# Patient Record
Sex: FEMALE | Race: WHITE | NOT HISPANIC OR LATINO | Employment: UNEMPLOYED
[De-identification: names, ages, dates, MRNs, and addresses within clinical notes are randomized per-mention and may not be internally consistent; named-entity substitution may affect disease eponyms.]

---

## 2017-05-04 ENCOUNTER — BH HISTORICAL (OUTPATIENT)
Dept: OTHER | Age: 27
End: 2017-05-04

## 2017-06-23 ENCOUNTER — BH HISTORICAL (OUTPATIENT)
Dept: OTHER | Age: 27
End: 2017-06-23

## 2017-06-26 ENCOUNTER — IMAGING SERVICES (OUTPATIENT)
Dept: OTHER | Age: 27
End: 2017-06-26

## 2017-06-26 ENCOUNTER — CHARTING TRANS (OUTPATIENT)
Dept: OTHER | Age: 27
End: 2017-06-26

## 2017-06-27 ENCOUNTER — CHARTING TRANS (OUTPATIENT)
Dept: OTHER | Age: 27
End: 2017-06-27

## 2017-06-30 ENCOUNTER — CHARTING TRANS (OUTPATIENT)
Dept: FAMILY MEDICINE | Age: 27
End: 2017-06-30

## 2017-07-01 ENCOUNTER — BH HISTORICAL (OUTPATIENT)
Dept: OTHER | Age: 27
End: 2017-07-01

## 2017-07-03 ENCOUNTER — CHARTING TRANS (OUTPATIENT)
Dept: OTHER | Age: 27
End: 2017-07-03

## 2017-07-05 ENCOUNTER — CHARTING TRANS (OUTPATIENT)
Dept: OTHER | Age: 27
End: 2017-07-05

## 2017-07-10 ENCOUNTER — LAB SERVICES (OUTPATIENT)
Dept: OTHER | Age: 27
End: 2017-07-10

## 2017-07-10 ENCOUNTER — IMAGING SERVICES (OUTPATIENT)
Dept: OTHER | Age: 27
End: 2017-07-10

## 2017-07-10 ENCOUNTER — CHARTING TRANS (OUTPATIENT)
Dept: OTHER | Age: 27
End: 2017-07-10

## 2017-07-10 LAB
AMPHET UR QL SCN: ABNORMAL
APAP UR QL SCN: ABNORMAL
BARBITURATES UR QL SCN>300 NG/ML: ABNORMAL
BENZODIAZ UR QL SCN>300 NG/ML: ABNORMAL
BZE UR QL SCN>300 NG/ML: ABNORMAL
METHADONE UR QL SCN>300 NG/ML: ABNORMAL
METHAMPHET UR QL SCN: ABNORMAL
OPIATES UR QL SCN: ABNORMAL
OXYCODONE+OXYMORPHONE UR QL SCN: NEGATIVE
PCP UR QL SCN>25 NG/ML: ABNORMAL
THC UR QL SCN>50 NG/ML: ABNORMAL
TRICYCLICS UR QL SCN: ABNORMAL

## 2017-07-12 ENCOUNTER — CHARTING TRANS (OUTPATIENT)
Dept: OTHER | Age: 27
End: 2017-07-12

## 2017-07-14 ENCOUNTER — BH HISTORICAL (OUTPATIENT)
Dept: OTHER | Age: 27
End: 2017-07-14

## 2017-07-15 LAB
MARIJUANA METAB CONF: POSITIVE
MARIJUANA METAB QT: 38 NG/ML
MARIJUANA METAB RES: POSITIVE

## 2017-07-17 ENCOUNTER — BH HISTORICAL (OUTPATIENT)
Dept: OTHER | Age: 27
End: 2017-07-17

## 2017-07-28 ENCOUNTER — CHARTING TRANS (OUTPATIENT)
Dept: OTHER | Age: 27
End: 2017-07-28

## 2017-07-31 ENCOUNTER — CHARTING TRANS (OUTPATIENT)
Dept: OBGYN | Age: 27
End: 2017-07-31

## 2017-07-31 ENCOUNTER — LAB SERVICES (OUTPATIENT)
Dept: OTHER | Age: 27
End: 2017-07-31

## 2017-08-02 ENCOUNTER — CHARTING TRANS (OUTPATIENT)
Dept: OTHER | Age: 27
End: 2017-08-02

## 2017-08-03 ENCOUNTER — CHARTING TRANS (OUTPATIENT)
Dept: OTHER | Age: 27
End: 2017-08-03

## 2017-08-07 ENCOUNTER — CHARTING TRANS (OUTPATIENT)
Dept: OTHER | Age: 27
End: 2017-08-07

## 2017-08-08 ENCOUNTER — CHARTING TRANS (OUTPATIENT)
Dept: OTHER | Age: 27
End: 2017-08-08

## 2017-08-09 ENCOUNTER — LAB SERVICES (OUTPATIENT)
Dept: OTHER | Age: 27
End: 2017-08-09

## 2017-08-09 ENCOUNTER — CHARTING TRANS (OUTPATIENT)
Dept: OTHER | Age: 27
End: 2017-08-09

## 2017-08-09 LAB — TSH SERPL DL<=0.05 MIU/L-ACNC: 0.6 M[IU]/L (ref 0.3–4.82)

## 2017-08-09 ASSESSMENT — PAIN SCALES - GENERAL: PAINLEVEL_OUTOF10: 6

## 2017-08-10 LAB
ANA SER QL IA: NORMAL RATIO (ref 0–0.6)
DSDNA IGG SERPL IA-ACNC: NORMAL [IU]/ML (ref 0–10)

## 2017-08-11 LAB
APTT INHIB SENS PPP: 25.3 SEC (ref 22–30)
APTT-LA IMM 1:2 NP PPP: 25.6 SEC (ref 22–30)
CARDIOLIPIN IGA SER IA-ACNC: <20 APL
CARDIOLIPIN IGG SER IA-ACNC: <20 GPL
CARDIOLIPIN IGM SER IA-ACNC: <20 MPL
CORTIS SERPL-MCNC: 8.8 MCG/DL (ref 3.4–22.5)
DRVVT IMM NP PPP: 37.2 SEC
LA 3 SCREEN W REFLEX-IMP: NORMAL
PATHOLOGIST NAME: NORMAL
SCREEN DRVVT: 38.6 SEC
THROMBIN TIME: 16 SEC (ref 15.3–21.1)

## 2017-08-14 LAB — AP REPORT: NORMAL

## 2017-08-22 ENCOUNTER — CHARTING TRANS (OUTPATIENT)
Dept: OTHER | Age: 27
End: 2017-08-22

## 2017-08-23 ENCOUNTER — CHARTING TRANS (OUTPATIENT)
Dept: OTHER | Age: 27
End: 2017-08-23

## 2017-09-13 ENCOUNTER — CHARTING TRANS (OUTPATIENT)
Dept: OTHER | Age: 27
End: 2017-09-13

## 2017-09-27 ENCOUNTER — CHARTING TRANS (OUTPATIENT)
Dept: OTHER | Age: 27
End: 2017-09-27

## 2017-10-06 ENCOUNTER — BH HISTORICAL (OUTPATIENT)
Dept: OTHER | Age: 27
End: 2017-10-06

## 2017-10-15 ENCOUNTER — IMAGING SERVICES (OUTPATIENT)
Dept: OTHER | Age: 27
End: 2017-10-15

## 2017-10-16 ENCOUNTER — APPOINTMENT (OUTPATIENT)
Dept: GENERAL RADIOLOGY | Age: 27
End: 2017-10-16
Attending: FAMILY MEDICINE
Payer: COMMERCIAL

## 2017-10-16 ENCOUNTER — HOSPITAL ENCOUNTER (OUTPATIENT)
Age: 27
Discharge: HOME | End: 2017-10-16
Attending: FAMILY MEDICINE
Payer: COMMERCIAL

## 2017-10-16 VITALS
SYSTOLIC BLOOD PRESSURE: 113 MMHG | RESPIRATION RATE: 16 BRPM | HEART RATE: 92 BPM | OXYGEN SATURATION: 100 % | DIASTOLIC BLOOD PRESSURE: 65 MMHG | TEMPERATURE: 98 F

## 2017-10-16 DIAGNOSIS — S63.502A LEFT WRIST SPRAIN, INITIAL ENCOUNTER: ICD-10-CM

## 2017-10-16 DIAGNOSIS — S63.92XA HAND SPRAIN, LEFT, INITIAL ENCOUNTER: Primary | ICD-10-CM

## 2017-10-16 DIAGNOSIS — S60.222A CONTUSION OF LEFT HAND, INITIAL ENCOUNTER: ICD-10-CM

## 2017-10-16 PROCEDURE — 99213 OFFICE O/P EST LOW 20 MIN: CPT

## 2017-10-16 PROCEDURE — 73110 X-RAY EXAM OF WRIST: CPT | Performed by: FAMILY MEDICINE

## 2017-10-16 PROCEDURE — 73130 X-RAY EXAM OF HAND: CPT | Performed by: FAMILY MEDICINE

## 2017-10-16 RX ORDER — DILTIAZEM HYDROCHLORIDE 60 MG/1
60 TABLET, FILM COATED ORAL 4 TIMES DAILY
COMMUNITY

## 2017-10-16 RX ORDER — ONDANSETRON 4 MG/1
4 TABLET, ORALLY DISINTEGRATING ORAL EVERY 8 HOURS PRN
COMMUNITY

## 2017-10-16 RX ORDER — CLONAZEPAM 0.5 MG/1
0.5 TABLET ORAL 2 TIMES DAILY PRN
COMMUNITY

## 2017-10-16 RX ORDER — QUETIAPINE 200 MG/1
200 TABLET, FILM COATED ORAL NIGHTLY
COMMUNITY

## 2017-10-16 RX ORDER — IBUPROFEN 200 MG
400 TABLET ORAL EVERY 6 HOURS PRN
COMMUNITY

## 2017-10-16 RX ORDER — PANTOPRAZOLE SODIUM 40 MG/1
40 TABLET, DELAYED RELEASE ORAL
COMMUNITY

## 2017-10-16 NOTE — ED INITIAL ASSESSMENT (HPI)
Patient fell down a flight of stairs yesterday at approximately 0100. C/O left hand pain that radiates into her right wrist. States she was treated at [facility] ED at that time. Was told she had a \"hairline fracture and there is nothing they could do.  It wou

## 2017-10-16 NOTE — ED PROVIDER NOTES
Patient Seen in: [id number] [facility]    History   Patient presents with:  Hand Injury    Stated Complaint: left hand and wrist pain/fell at home    HPI    31-year-old female who presents to the clinic today with chief complaints of injury to HPI.    Physical Exam   ED Triage Vitals [10/16/17 1245]  BP: 113/65  Pulse: 89  Resp: 16  Temp: 98 °F (36.7 °C)  Temp src: Temporal  SpO2: 98 %  O2 Device: None (Room air)    Current:/65   Pulse 89   Temp 98 °F (36.7 °C) (Temporal)   Resp 16   LMP 1 nothing they could do. It would heal itself\". States she has 15  previous fractures to the left hand. CONCLUSION:  The joint spaces are normal. There is no fracture.  There is dorsal soft tissue swelling at the level of metacarpals, moderate degre (primary encounter diagnosis)  Left wrist sprain, initial encounter  Contusion of left hand, initial encounter    Disposition:  Discharge    Follow-up:  Zulma Nam [phone]    In 1 week  For re-check      Reina Khalil

## 2017-10-18 ENCOUNTER — BH HISTORICAL (OUTPATIENT)
Dept: OTHER | Age: 27
End: 2017-10-18

## 2017-10-19 ENCOUNTER — CHARTING TRANS (OUTPATIENT)
Dept: SPORTS MEDICINE | Age: 27
End: 2017-10-19

## 2017-10-19 ENCOUNTER — IMAGING SERVICES (OUTPATIENT)
Dept: OTHER | Age: 27
End: 2017-10-19

## 2017-11-02 ENCOUNTER — IMAGING SERVICES (OUTPATIENT)
Dept: OTHER | Age: 27
End: 2017-11-02

## 2017-11-06 ENCOUNTER — CHARTING TRANS (OUTPATIENT)
Dept: OTHER | Age: 27
End: 2017-11-06

## 2017-11-20 ENCOUNTER — CHARTING TRANS (OUTPATIENT)
Dept: OTHER | Age: 27
End: 2017-11-20

## 2017-12-01 ENCOUNTER — BH HISTORICAL (OUTPATIENT)
Dept: OTHER | Age: 27
End: 2017-12-01

## 2017-12-15 ENCOUNTER — CHARTING TRANS (OUTPATIENT)
Dept: OTHER | Age: 27
End: 2017-12-15

## 2017-12-18 ENCOUNTER — CHARTING TRANS (OUTPATIENT)
Dept: OBGYN | Age: 27
End: 2017-12-18

## 2018-01-02 ENCOUNTER — CHARTING TRANS (OUTPATIENT)
Dept: OTHER | Age: 28
End: 2018-01-02

## 2018-01-03 ENCOUNTER — LAB SERVICES (OUTPATIENT)
Dept: OTHER | Age: 28
End: 2018-01-03

## 2018-01-03 LAB
FOLLICLE STIMULATING: 5.11 M[IU]/ML (ref 2–25)
GLUCOSE P FAST SERPL-MCNC: 97 MG/DL (ref 60–100)
PROLACTIN SERPL-MCNC: 11.1 NG/ML (ref 3–19)
TSH SERPL DL<=0.05 MIU/L-ACNC: 1.51 M[IU]/L (ref 0.3–4.82)

## 2018-01-04 ENCOUNTER — CHARTING TRANS (OUTPATIENT)
Dept: OTHER | Age: 28
End: 2018-01-04

## 2018-02-21 ENCOUNTER — LAB SERVICES (OUTPATIENT)
Dept: OTHER | Age: 28
End: 2018-02-21

## 2018-02-21 LAB
APPEARANCE: ABNORMAL
BASOPHIL AUTOMATED: 0.5 %
BASOPHILS: 0 (ref 0–0.2)
BILIRUBIN: ABNORMAL
COLOR: YELLOW
EOSINOPHIL AUTOMATED: 7.7 %
EOSINOPHILS: 0.6 (ref 0–0.5)
GLUCOSE, URINE, AUTOMATED: ABNORMAL MG/DL
HEMATOCRIT: 40.4 % (ref 34.7–45.1)
HEMOGLOBIN: 13.2 GM/DL (ref 12–15.3)
INFLUENZA ANTIGEN: NORMAL
KETONES, URINE, AUTOMATED: ABNORMAL MG/DL
LEUKOCYTE, URINE, AUTOMATED: ABNORMAL
LYMPHOCYTE AUTOMATED: 20.9 %
LYMPHOCYTES: 1.5 (ref 0.6–3.4)
MEAN CORPUSCULAR HGB: 28.9 PG (ref 26–34)
MEAN CORPUSCULAR HGB: 32.6 G/DL (ref 32.5–35.8)
MEAN CORPUSCULAR VOL: 88.7 FL (ref 80–100)
MEAN PLATELET VOLUME: 10.7 FL (ref 6.8–10.2)
MONOCYTE AUTOMATED: 10 %
MONOCYTES: 0.7 (ref 0.3–1)
NEUTROPHIL ABSOLUTE: 4.4 (ref 1.7–7.7)
NEUTROPHIL AUTOMATED: 60.9 %
NITRITE, URINE AUTOMATED: NEGATIVE
PH, URINE: 5 (ref 5–8)
PLATELET COUNT: 198 K/MM3 (ref 150–450)
PROTEIN, URINE: ABNORMAL MG/DL
RED BLOOD CELL COUNT: 4.56 M/MM3 (ref 3.63–5.04)
RED CELL DISTRIBUTIO: 14.6 % (ref 11.9–15.9)
SPECIFIC GRAVITY UA: 1.02 (ref 1–1.03)
URINE, BLOOD, AUTOMATED: ABNORMAL
UROBILINOGEN, URINE, AUTOMATED: ABNORMAL MG/DL
WHITE BLOOD CELL COU: 7.2 K/MM3 (ref 4–11)

## 2018-02-26 ENCOUNTER — CHARTING TRANS (OUTPATIENT)
Dept: OTHER | Age: 28
End: 2018-02-26

## 2018-03-09 ENCOUNTER — ANCILLARY ORDERS (OUTPATIENT)
Dept: OTHER | Age: 28
End: 2018-03-09

## 2018-03-09 ENCOUNTER — CHARTING TRANS (OUTPATIENT)
Dept: OTHER | Age: 28
End: 2018-03-09

## 2018-03-09 DIAGNOSIS — Z80.3 FAMILY HISTORY OF MALIGNANT NEOPLASM OF BREAST: ICD-10-CM

## 2018-03-09 DIAGNOSIS — N64.4 MASTODYNIA: ICD-10-CM

## 2018-04-12 ENCOUNTER — CHARTING TRANS (OUTPATIENT)
Dept: OTHER | Age: 28
End: 2018-04-12

## 2018-04-16 ENCOUNTER — CHARTING TRANS (OUTPATIENT)
Dept: OTHER | Age: 28
End: 2018-04-16

## 2018-04-16 ENCOUNTER — ANCILLARY ORDERS (OUTPATIENT)
Dept: OTHER | Age: 28
End: 2018-04-16

## 2018-04-16 DIAGNOSIS — N64.4 MASTODYNIA: ICD-10-CM

## 2018-04-16 DIAGNOSIS — N60.19 DIFFUSE CYSTIC MASTOPATHY OF BREAST: ICD-10-CM

## 2018-05-23 ENCOUNTER — CHARTING TRANS (OUTPATIENT)
Dept: OTHER | Age: 28
End: 2018-05-23

## 2018-11-23 ENCOUNTER — IMAGING SERVICES (OUTPATIENT)
Dept: OTHER | Age: 28
End: 2018-11-23

## 2018-11-27 VITALS
WEIGHT: 160 LBS | SYSTOLIC BLOOD PRESSURE: 102 MMHG | BODY MASS INDEX: 32.25 KG/M2 | DIASTOLIC BLOOD PRESSURE: 64 MMHG | HEIGHT: 59 IN

## 2018-11-28 VITALS
BODY MASS INDEX: 28.63 KG/M2 | OXYGEN SATURATION: 98 % | RESPIRATION RATE: 16 BRPM | HEIGHT: 59 IN | SYSTOLIC BLOOD PRESSURE: 94 MMHG | HEART RATE: 82 BPM | DIASTOLIC BLOOD PRESSURE: 58 MMHG | WEIGHT: 142 LBS

## 2018-11-28 VITALS
TEMPERATURE: 98.4 F | WEIGHT: 146 LBS | DIASTOLIC BLOOD PRESSURE: 80 MMHG | SYSTOLIC BLOOD PRESSURE: 118 MMHG | HEART RATE: 80 BPM | RESPIRATION RATE: 16 BRPM

## 2018-11-28 VITALS
DIASTOLIC BLOOD PRESSURE: 62 MMHG | BODY MASS INDEX: 30.44 KG/M2 | RESPIRATION RATE: 16 BRPM | HEIGHT: 59 IN | HEART RATE: 83 BPM | OXYGEN SATURATION: 98 % | SYSTOLIC BLOOD PRESSURE: 106 MMHG | WEIGHT: 151 LBS

## 2018-11-28 VITALS
DIASTOLIC BLOOD PRESSURE: 76 MMHG | WEIGHT: 155 LBS | SYSTOLIC BLOOD PRESSURE: 124 MMHG | BODY MASS INDEX: 31.25 KG/M2 | HEART RATE: 84 BPM | HEIGHT: 59 IN

## 2018-11-28 VITALS
HEIGHT: 59 IN | DIASTOLIC BLOOD PRESSURE: 70 MMHG | BODY MASS INDEX: 30.44 KG/M2 | WEIGHT: 151 LBS | SYSTOLIC BLOOD PRESSURE: 108 MMHG

## 2019-01-24 ENCOUNTER — IMAGING SERVICES (OUTPATIENT)
Dept: OTHER | Age: 29
End: 2019-01-24

## 2019-03-06 VITALS
BODY MASS INDEX: 34.13 KG/M2 | TEMPERATURE: 97 F | DIASTOLIC BLOOD PRESSURE: 60 MMHG | WEIGHT: 169 LBS | HEART RATE: 102 BPM | SYSTOLIC BLOOD PRESSURE: 120 MMHG | RESPIRATION RATE: 20 BRPM | OXYGEN SATURATION: 99 %

## 2019-03-06 VITALS
BODY MASS INDEX: 34.47 KG/M2 | HEART RATE: 103 BPM | HEIGHT: 59 IN | RESPIRATION RATE: 16 BRPM | OXYGEN SATURATION: 99 % | DIASTOLIC BLOOD PRESSURE: 68 MMHG | WEIGHT: 171 LBS | SYSTOLIC BLOOD PRESSURE: 104 MMHG

## 2019-03-06 VITALS — BODY MASS INDEX: 34.07 KG/M2 | HEIGHT: 59 IN | WEIGHT: 169 LBS

## 2020-01-15 ENCOUNTER — APPOINTMENT (OUTPATIENT)
Dept: OBGYN | Age: 30
End: 2020-01-15

## 2020-01-15 ENCOUNTER — OFFICE VISIT (OUTPATIENT)
Dept: OBGYN | Age: 30
End: 2020-01-15

## 2020-01-15 VITALS
HEART RATE: 68 BPM | WEIGHT: 136.2 LBS | RESPIRATION RATE: 16 BRPM | DIASTOLIC BLOOD PRESSURE: 56 MMHG | TEMPERATURE: 97.5 F | OXYGEN SATURATION: 100 % | HEIGHT: 59 IN | SYSTOLIC BLOOD PRESSURE: 94 MMHG | BODY MASS INDEX: 27.46 KG/M2

## 2020-01-15 DIAGNOSIS — R10.2 PELVIC PAIN IN FEMALE: Primary | ICD-10-CM

## 2020-01-15 PROCEDURE — 99214 OFFICE O/P EST MOD 30 MIN: CPT | Performed by: OBSTETRICS & GYNECOLOGY

## 2020-01-15 RX ORDER — PANTOPRAZOLE SODIUM 40 MG/1
40 TABLET, DELAYED RELEASE ORAL
COMMUNITY
Start: 2017-09-13 | End: 2020-12-01 | Stop reason: ALTCHOICE

## 2020-01-15 RX ORDER — SWAB
1 SWAB, NON-MEDICATED MISCELLANEOUS
COMMUNITY
End: 2020-12-01 | Stop reason: ALTCHOICE

## 2020-01-15 RX ORDER — ALBUTEROL SULFATE 90 UG/1
2 AEROSOL, METERED RESPIRATORY (INHALATION)
COMMUNITY
End: 2021-09-20 | Stop reason: SDUPTHER

## 2020-01-16 ENCOUNTER — IMAGING SERVICES (OUTPATIENT)
Dept: ULTRASOUND IMAGING | Age: 30
End: 2020-01-16
Attending: OBSTETRICS & GYNECOLOGY

## 2020-01-16 ENCOUNTER — IMAGING SERVICES (OUTPATIENT)
Dept: CT IMAGING | Age: 30
End: 2020-01-16

## 2020-01-16 DIAGNOSIS — R10.2 PELVIC PAIN IN FEMALE: ICD-10-CM

## 2020-01-16 PROCEDURE — 76856 US EXAM PELVIC COMPLETE: CPT | Performed by: RADIOLOGY

## 2020-01-16 PROCEDURE — 76830 TRANSVAGINAL US NON-OB: CPT | Performed by: RADIOLOGY

## 2020-11-30 ENCOUNTER — TELEPHONE (OUTPATIENT)
Dept: CARDIOLOGY | Age: 30
End: 2020-11-30

## 2020-12-01 ENCOUNTER — TELEPHONE (OUTPATIENT)
Dept: CARDIOLOGY | Age: 30
End: 2020-12-01

## 2020-12-01 ENCOUNTER — OFFICE VISIT (OUTPATIENT)
Dept: CARDIOLOGY | Age: 30
End: 2020-12-01

## 2020-12-01 ENCOUNTER — OFFICE VISIT (OUTPATIENT)
Dept: OBGYN | Age: 30
End: 2020-12-01

## 2020-12-01 ENCOUNTER — ANCILLARY PROCEDURE (OUTPATIENT)
Dept: CARDIOLOGY | Age: 30
End: 2020-12-01
Attending: INTERNAL MEDICINE

## 2020-12-01 ENCOUNTER — APPOINTMENT (OUTPATIENT)
Dept: CARDIOLOGY | Age: 30
End: 2020-12-01

## 2020-12-01 DIAGNOSIS — M62.89 PELVIC FLOOR DYSFUNCTION: ICD-10-CM

## 2020-12-01 DIAGNOSIS — G45.9 TIA (TRANSIENT ISCHEMIC ATTACK): ICD-10-CM

## 2020-12-01 DIAGNOSIS — R07.9 CHEST PAIN, UNSPECIFIED TYPE: ICD-10-CM

## 2020-12-01 DIAGNOSIS — R00.2 HEART PALPITATIONS: Primary | ICD-10-CM

## 2020-12-01 DIAGNOSIS — R00.2 HEART PALPITATIONS: ICD-10-CM

## 2020-12-01 DIAGNOSIS — R10.2 PELVIC PAIN IN FEMALE: Primary | ICD-10-CM

## 2020-12-01 PROCEDURE — 93000 ELECTROCARDIOGRAM COMPLETE: CPT | Performed by: INTERNAL MEDICINE

## 2020-12-01 PROCEDURE — 99214 OFFICE O/P EST MOD 30 MIN: CPT | Performed by: OBSTETRICS & GYNECOLOGY

## 2020-12-01 PROCEDURE — 99214 OFFICE O/P EST MOD 30 MIN: CPT | Performed by: INTERNAL MEDICINE

## 2020-12-01 PROCEDURE — 93268 ECG RECORD/REVIEW: CPT | Performed by: INTERNAL MEDICINE

## 2020-12-01 ASSESSMENT — PAIN SCALES - GENERAL: PAINLEVEL: 7-8

## 2020-12-03 VITALS
DIASTOLIC BLOOD PRESSURE: 70 MMHG | OXYGEN SATURATION: 89 % | WEIGHT: 156 LBS | BODY MASS INDEX: 31.45 KG/M2 | HEIGHT: 59 IN | HEART RATE: 89 BPM | SYSTOLIC BLOOD PRESSURE: 96 MMHG

## 2020-12-04 PROBLEM — D68.62 LUPUS ANTICOAGULANT SYNDROME (CMD): Status: ACTIVE | Noted: 2020-12-04

## 2020-12-04 PROBLEM — G45.9 TIA (TRANSIENT ISCHEMIC ATTACK): Status: ACTIVE | Noted: 2020-12-04

## 2020-12-04 PROBLEM — G95.0 SYRINGOMYELIA AND SYRINGOBULBIA (CMD): Status: ACTIVE | Noted: 2020-12-04

## 2020-12-04 PROBLEM — G81.94 HEMIPLEGIA, UNSPECIFIED AFFECTING LEFT NONDOMINANT SIDE (CMD): Status: ACTIVE | Noted: 2020-12-04

## 2020-12-04 PROBLEM — F31.9 BIPOLAR DISORDER, UNSPECIFIED (CMD): Status: ACTIVE | Noted: 2020-12-04

## 2020-12-08 ENCOUNTER — TELEPHONE (OUTPATIENT)
Dept: CARDIOLOGY | Age: 30
End: 2020-12-08

## 2020-12-08 ENCOUNTER — OFFICE VISIT (OUTPATIENT)
Dept: OBGYN | Age: 30
End: 2020-12-08

## 2020-12-08 VITALS
HEART RATE: 89 BPM | DIASTOLIC BLOOD PRESSURE: 76 MMHG | TEMPERATURE: 97.1 F | RESPIRATION RATE: 18 BRPM | SYSTOLIC BLOOD PRESSURE: 110 MMHG | WEIGHT: 157.4 LBS | BODY MASS INDEX: 31.79 KG/M2 | OXYGEN SATURATION: 98 %

## 2020-12-08 DIAGNOSIS — Z32.02 ENCOUNTER FOR PREGNANCY TEST WITH RESULT NEGATIVE: ICD-10-CM

## 2020-12-08 DIAGNOSIS — Z30.017 NEXPLANON INSERTION: Primary | ICD-10-CM

## 2020-12-08 DIAGNOSIS — I95.89 OTHER SPECIFIED HYPOTENSION: Primary | ICD-10-CM

## 2020-12-08 LAB
B-HCG UR QL: NEGATIVE
INTERNAL PROCEDURAL CONTROLS ACCEPTABLE: YES

## 2020-12-08 PROCEDURE — 81025 URINE PREGNANCY TEST: CPT | Performed by: OBSTETRICS & GYNECOLOGY

## 2020-12-08 PROCEDURE — 11981 INSERTION DRUG DLVR IMPLANT: CPT | Performed by: OBSTETRICS & GYNECOLOGY

## 2020-12-10 RX ORDER — MIDODRINE HYDROCHLORIDE 2.5 MG/1
2.5 TABLET ORAL 2 TIMES DAILY
Qty: 60 TABLET | Refills: 0 | Status: SHIPPED | OUTPATIENT
Start: 2020-12-10 | End: 2020-12-22 | Stop reason: SDUPTHER

## 2020-12-15 PROBLEM — G89.29 CHRONIC PELVIC PAIN IN FEMALE: Status: ACTIVE | Noted: 2020-12-15

## 2020-12-15 PROBLEM — R10.2 CHRONIC PELVIC PAIN IN FEMALE: Status: ACTIVE | Noted: 2020-12-15

## 2020-12-17 ENCOUNTER — OFFICE VISIT (OUTPATIENT)
Dept: PHYSICAL THERAPY | Age: 30
End: 2020-12-17
Attending: OBSTETRICS & GYNECOLOGY

## 2020-12-17 ENCOUNTER — ANCILLARY PROCEDURE (OUTPATIENT)
Dept: CARDIOLOGY | Age: 30
End: 2020-12-17
Attending: INTERNAL MEDICINE

## 2020-12-17 DIAGNOSIS — G89.29 CHRONIC PELVIC PAIN IN FEMALE: Primary | ICD-10-CM

## 2020-12-17 DIAGNOSIS — R10.2 CHRONIC PELVIC PAIN IN FEMALE: Primary | ICD-10-CM

## 2020-12-17 DIAGNOSIS — R00.2 HEART PALPITATIONS: ICD-10-CM

## 2020-12-17 PROCEDURE — 93306 TTE W/DOPPLER COMPLETE: CPT | Performed by: INTERNAL MEDICINE

## 2020-12-17 PROCEDURE — 97161 PT EVAL LOW COMPLEX 20 MIN: CPT | Performed by: PHYSICAL THERAPIST

## 2020-12-17 PROCEDURE — 97112 NEUROMUSCULAR REEDUCATION: CPT | Performed by: PHYSICAL THERAPIST

## 2020-12-17 ASSESSMENT — ENCOUNTER SYMPTOMS
PAIN SEVERITY NOW: 6
PAIN SCALE AT HIGHEST: 10
PAIN FREQUENCY: CONSTANT
ALLEVIATING FACTORS: HEAT
PAIN DESCRIPTION: STABBING

## 2020-12-22 ENCOUNTER — OFFICE VISIT (OUTPATIENT)
Dept: PHYSICAL THERAPY | Age: 30
End: 2020-12-22

## 2020-12-22 ENCOUNTER — TELEPHONE (OUTPATIENT)
Dept: OBGYN | Age: 30
End: 2020-12-22

## 2020-12-22 DIAGNOSIS — G89.29 CHRONIC PELVIC PAIN IN FEMALE: Primary | ICD-10-CM

## 2020-12-22 DIAGNOSIS — R10.2 CHRONIC PELVIC PAIN IN FEMALE: Primary | ICD-10-CM

## 2020-12-22 PROCEDURE — 97112 NEUROMUSCULAR REEDUCATION: CPT | Performed by: PHYSICAL THERAPIST

## 2020-12-22 PROCEDURE — 97140 MANUAL THERAPY 1/> REGIONS: CPT | Performed by: PHYSICAL THERAPIST

## 2020-12-22 RX ORDER — MIDODRINE HYDROCHLORIDE 2.5 MG/1
2.5 TABLET ORAL 3 TIMES DAILY
Qty: 60 TABLET | Refills: 0 | Status: SHIPPED | COMMUNITY
Start: 2020-12-22 | End: 2021-01-05 | Stop reason: SDUPTHER

## 2020-12-22 ASSESSMENT — ENCOUNTER SYMPTOMS: PAIN SEVERITY NOW: 8

## 2020-12-23 RX ORDER — CYCLOBENZAPRINE HCL 5 MG
5 TABLET ORAL 3 TIMES DAILY PRN
Qty: 30 TABLET | Refills: 1 | Status: SHIPPED | OUTPATIENT
Start: 2020-12-23 | End: 2021-01-18

## 2021-01-05 ENCOUNTER — OFFICE VISIT (OUTPATIENT)
Dept: PHYSICAL THERAPY | Age: 31
End: 2021-01-05

## 2021-01-05 DIAGNOSIS — G89.29 CHRONIC PELVIC PAIN IN FEMALE: Primary | ICD-10-CM

## 2021-01-05 DIAGNOSIS — I95.89 OTHER SPECIFIED HYPOTENSION: ICD-10-CM

## 2021-01-05 DIAGNOSIS — R10.2 CHRONIC PELVIC PAIN IN FEMALE: Primary | ICD-10-CM

## 2021-01-05 PROCEDURE — 97112 NEUROMUSCULAR REEDUCATION: CPT | Performed by: PHYSICAL THERAPIST

## 2021-01-05 PROCEDURE — 97140 MANUAL THERAPY 1/> REGIONS: CPT | Performed by: PHYSICAL THERAPIST

## 2021-01-05 ASSESSMENT — ENCOUNTER SYMPTOMS: PAIN SEVERITY NOW: 9

## 2021-01-06 ENCOUNTER — APPOINTMENT (OUTPATIENT)
Dept: CARDIOLOGY | Age: 31
End: 2021-01-06
Attending: INTERNAL MEDICINE

## 2021-01-06 RX ORDER — MIDODRINE HYDROCHLORIDE 2.5 MG/1
2.5 TABLET ORAL 3 TIMES DAILY
Qty: 90 TABLET | Refills: 5 | Status: SHIPPED | OUTPATIENT
Start: 2021-01-06

## 2021-01-12 ENCOUNTER — TELEPHONE (OUTPATIENT)
Dept: MAMMOGRAPHY | Age: 31
End: 2021-01-12

## 2021-01-18 RX ORDER — CYCLOBENZAPRINE HCL 5 MG
TABLET ORAL
Qty: 30 TABLET | Refills: 1 | Status: SHIPPED | OUTPATIENT
Start: 2021-01-18 | End: 2021-05-07 | Stop reason: SDUPTHER

## 2021-01-20 ENCOUNTER — TELEPHONE (OUTPATIENT)
Dept: PHYSICAL THERAPY | Age: 31
End: 2021-01-20

## 2021-01-27 ENCOUNTER — TELEPHONE (OUTPATIENT)
Dept: PHYSICAL THERAPY | Age: 31
End: 2021-01-27

## 2021-02-26 ENCOUNTER — TELEPHONE (OUTPATIENT)
Dept: CARDIOLOGY | Age: 31
End: 2021-02-26

## 2021-03-01 ENCOUNTER — APPOINTMENT (OUTPATIENT)
Dept: CARDIOLOGY | Age: 31
End: 2021-03-01

## 2021-03-11 ENCOUNTER — TELEPHONE (OUTPATIENT)
Dept: CARDIOLOGY | Age: 31
End: 2021-03-11

## 2021-05-17 RX ORDER — CYCLOBENZAPRINE HCL 5 MG
5 TABLET ORAL 3 TIMES DAILY PRN
Qty: 30 TABLET | Refills: 1 | Status: SHIPPED | OUTPATIENT
Start: 2021-05-17 | End: 2021-10-09 | Stop reason: ALTCHOICE

## 2021-05-25 VITALS
DIASTOLIC BLOOD PRESSURE: 58 MMHG | WEIGHT: 157 LBS | BODY MASS INDEX: 31.65 KG/M2 | RESPIRATION RATE: 16 BRPM | HEART RATE: 89 BPM | TEMPERATURE: 97.8 F | OXYGEN SATURATION: 97 % | HEIGHT: 59 IN | SYSTOLIC BLOOD PRESSURE: 98 MMHG

## 2021-09-20 ENCOUNTER — OFFICE VISIT (OUTPATIENT)
Dept: FAMILY MEDICINE | Age: 31
End: 2021-09-20

## 2021-09-20 VITALS
DIASTOLIC BLOOD PRESSURE: 60 MMHG | RESPIRATION RATE: 16 BRPM | WEIGHT: 164 LBS | BODY MASS INDEX: 33.06 KG/M2 | HEART RATE: 100 BPM | HEIGHT: 59 IN | TEMPERATURE: 97.9 F | SYSTOLIC BLOOD PRESSURE: 110 MMHG

## 2021-09-20 DIAGNOSIS — R29.898 LEFT LEG WEAKNESS: Primary | ICD-10-CM

## 2021-09-20 DIAGNOSIS — G89.29 CHRONIC MIDLINE THORACIC BACK PAIN: ICD-10-CM

## 2021-09-20 DIAGNOSIS — F41.9 ANXIETY: ICD-10-CM

## 2021-09-20 DIAGNOSIS — G95.0 SYRINX OF SPINAL CORD (CMD): ICD-10-CM

## 2021-09-20 DIAGNOSIS — M54.6 CHRONIC MIDLINE THORACIC BACK PAIN: ICD-10-CM

## 2021-09-20 DIAGNOSIS — R29.898 LEFT ARM WEAKNESS: ICD-10-CM

## 2021-09-20 DIAGNOSIS — F31.32 BIPOLAR AFFECTIVE DISORDER, CURRENTLY DEPRESSED, MODERATE (CMD): ICD-10-CM

## 2021-09-20 PROCEDURE — 99205 OFFICE O/P NEW HI 60 MIN: CPT | Performed by: FAMILY MEDICINE

## 2021-09-20 RX ORDER — DULOXETIN HYDROCHLORIDE 30 MG/1
30 CAPSULE, DELAYED RELEASE ORAL DAILY
Qty: 30 CAPSULE | Refills: 1 | Status: SHIPPED | OUTPATIENT
Start: 2021-09-20 | End: 2021-11-09

## 2021-09-20 RX ORDER — PYRIDOSTIGMINE BROMIDE 60 MG/1
30 TABLET ORAL 3 TIMES DAILY
COMMUNITY
End: 2021-09-20

## 2021-09-20 RX ORDER — ALBUTEROL SULFATE 90 UG/1
2 AEROSOL, METERED RESPIRATORY (INHALATION)
Qty: 18 G | Refills: 0 | Status: SHIPPED | OUTPATIENT
Start: 2021-09-20

## 2021-09-20 RX ORDER — PANTOPRAZOLE SODIUM 40 MG/1
40 TABLET, DELAYED RELEASE ORAL DAILY
COMMUNITY

## 2021-09-20 ASSESSMENT — PATIENT HEALTH QUESTIONNAIRE - PHQ9
1. LITTLE INTEREST OR PLEASURE IN DOING THINGS: NEARLY EVERY DAY
9. THOUGHTS THAT YOU WOULD BE BETTER OFF DEAD, OR OF HURTING YOURSELF: NOT AT ALL
SUM OF ALL RESPONSES TO PHQ9 QUESTIONS 1 AND 2: 3
SUM OF ALL RESPONSES TO PHQ9 QUESTIONS 1 TO 9: 13
6. FEELING BAD ABOUT YOURSELF - OR THAT YOU ARE A FAILURE OR HAVE LET YOURSELF OR YOUR FAMILY DOWN: NOT AT ALL
SUM OF ALL RESPONSES TO PHQ QUESTIONS 1-9: 13
CLINICAL INTERPRETATION OF PHQ9 SCORE: FURTHER SCREENING NEEDED
3. TROUBLE FALLING OR STAYING ASLEEP OR SLEEPING TOO MUCH: MORE THAN HALF THE DAYS
SUM OF ALL RESPONSES TO PHQ9 QUESTIONS 1 AND 2: 3
4. FEELING TIRED OR HAVING LITTLE ENERGY: NEARLY EVERY DAY
CLINICAL INTERPRETATION OF PHQ9 SCORE: MODERATE DEPRESSION
10. IF YOU CHECKED OFF ANY PROBLEMS, HOW DIFFICULT HAVE THESE PROBLEMS MADE IT FOR YOU TO DO YOUR WORK, TAKE CARE OF THINGS AT HOME, OR GET ALONG WITH OTHER PEOPLE: EXTREMELY DIFFICULT
5. POOR APPETITE, WEIGHT LOSS, OR OVEREATING: MORE THAN HALF THE DAYS
CLINICAL INTERPRETATION OF PHQ2 SCORE: MODERATE DEPRESSION
2. FEELING DOWN, DEPRESSED OR HOPELESS: NOT AT ALL
8. MOVING OR SPEAKING SO SLOWLY THAT OTHER PEOPLE COULD HAVE NOTICED. OR THE OPPOSITE, BEING SO FIGETY OR RESTLESS THAT YOU HAVE BEEN MOVING AROUND A LOT MORE THAN USUAL: SEVERAL DAYS
CLINICAL INTERPRETATION OF PHQ2 SCORE: FURTHER SCREENING NEEDED
7. TROUBLE CONCENTRATING ON THINGS, SUCH AS READING THE NEWSPAPER OR WATCHING TELEVISION: MORE THAN HALF THE DAYS

## 2021-09-21 ENCOUNTER — TELEPHONE (OUTPATIENT)
Dept: INTERNAL MEDICINE | Age: 31
End: 2021-09-21

## 2021-09-21 DIAGNOSIS — G95.0 SYRINX OF SPINAL CORD (CMD): Primary | ICD-10-CM

## 2021-09-23 ENCOUNTER — TELEPHONE (OUTPATIENT)
Dept: SPORTS MEDICINE | Age: 31
End: 2021-09-23

## 2021-09-27 ENCOUNTER — TELEPHONE (OUTPATIENT)
Dept: INTERNAL MEDICINE | Age: 31
End: 2021-09-27

## 2021-10-08 ENCOUNTER — TELEPHONE (OUTPATIENT)
Dept: FAMILY MEDICINE | Age: 31
End: 2021-10-08

## 2021-10-09 RX ORDER — BACLOFEN 5 MG/1
5 TABLET ORAL 2 TIMES DAILY PRN
Qty: 30 TABLET | Refills: 0 | Status: SHIPPED | OUTPATIENT
Start: 2021-10-09 | End: 2021-11-09

## 2021-10-09 RX ORDER — BACLOFEN 5 MG/1
5 TABLET ORAL 2 TIMES DAILY PRN
Qty: 30 TABLET | Refills: 0 | Status: SHIPPED | OUTPATIENT
Start: 2021-10-09 | End: 2021-10-09 | Stop reason: SDUPTHER

## 2021-11-04 ENCOUNTER — APPOINTMENT (OUTPATIENT)
Dept: FAMILY MEDICINE | Age: 31
End: 2021-11-04

## 2021-11-09 ENCOUNTER — TELEPHONE (OUTPATIENT)
Dept: OBGYN | Age: 31
End: 2021-11-09

## 2021-11-09 RX ORDER — DULOXETIN HYDROCHLORIDE 30 MG/1
30 CAPSULE, DELAYED RELEASE ORAL DAILY
Qty: 30 CAPSULE | Refills: 1 | Status: SHIPPED | OUTPATIENT
Start: 2021-11-09 | End: 2022-03-30

## 2021-11-09 RX ORDER — BACLOFEN 5 MG/1
TABLET ORAL
Qty: 30 TABLET | Refills: 0 | Status: SHIPPED | OUTPATIENT
Start: 2021-11-09

## 2021-11-22 RX ORDER — CYCLOBENZAPRINE HCL 5 MG
5 TABLET ORAL 2 TIMES DAILY PRN
Qty: 30 TABLET | Refills: 0 | Status: SHIPPED | OUTPATIENT
Start: 2021-11-22 | End: 2022-03-28 | Stop reason: SDUPTHER

## 2022-03-24 RX ORDER — CYCLOBENZAPRINE HCL 5 MG
TABLET ORAL
Qty: 30 TABLET | Refills: 0 | OUTPATIENT
Start: 2022-03-24

## 2022-03-28 RX ORDER — CYCLOBENZAPRINE HCL 5 MG
5 TABLET ORAL 2 TIMES DAILY PRN
Qty: 30 TABLET | Refills: 1 | Status: SHIPPED | OUTPATIENT
Start: 2022-03-28 | End: 2022-04-27

## 2022-03-30 RX ORDER — DULOXETIN HYDROCHLORIDE 30 MG/1
30 CAPSULE, DELAYED RELEASE ORAL DAILY
Qty: 30 CAPSULE | Refills: 0 | Status: SHIPPED | OUTPATIENT
Start: 2022-03-30 | End: 2023-11-08 | Stop reason: ALTCHOICE

## 2022-03-30 RX ORDER — BACLOFEN 5 MG/1
TABLET ORAL
Qty: 30 TABLET | Refills: 0 | OUTPATIENT
Start: 2022-03-30

## 2022-04-04 ENCOUNTER — OFFICE VISIT (OUTPATIENT)
Dept: OBGYN | Age: 32
End: 2022-04-04

## 2022-04-04 VITALS
WEIGHT: 160 LBS | DIASTOLIC BLOOD PRESSURE: 60 MMHG | HEART RATE: 100 BPM | HEIGHT: 60 IN | SYSTOLIC BLOOD PRESSURE: 90 MMHG | BODY MASS INDEX: 31.41 KG/M2 | OXYGEN SATURATION: 98 % | TEMPERATURE: 97.3 F

## 2022-04-04 DIAGNOSIS — Z12.4 PAP SMEAR FOR CERVICAL CANCER SCREENING: ICD-10-CM

## 2022-04-04 DIAGNOSIS — R10.2 CHRONIC PELVIC PAIN IN FEMALE: ICD-10-CM

## 2022-04-04 DIAGNOSIS — Z12.39 SCREENING BREAST EXAMINATION: ICD-10-CM

## 2022-04-04 DIAGNOSIS — Z01.419 GYNECOLOGIC EXAM NORMAL: Primary | ICD-10-CM

## 2022-04-04 DIAGNOSIS — N94.10 DYSPAREUNIA, FEMALE: ICD-10-CM

## 2022-04-04 DIAGNOSIS — G89.29 CHRONIC PELVIC PAIN IN FEMALE: ICD-10-CM

## 2022-04-04 DIAGNOSIS — Z11.51 SCREENING FOR HUMAN PAPILLOMAVIRUS (HPV): ICD-10-CM

## 2022-04-04 PROCEDURE — PSEU9939 HPV, HIGH RISK: Performed by: CLINICAL MEDICAL LABORATORY

## 2022-04-04 PROCEDURE — 88142 CYTOPATH C/V THIN LAYER: CPT | Performed by: PATHOLOGY

## 2022-04-04 PROCEDURE — 99395 PREV VISIT EST AGE 18-39: CPT | Performed by: OBSTETRICS & GYNECOLOGY

## 2022-04-04 PROCEDURE — 87624 HPV HI-RISK TYP POOLED RSLT: CPT | Performed by: CLINICAL MEDICAL LABORATORY

## 2022-04-04 PROCEDURE — 87624 HPV HI-RISK TYP POOLED RSLT: CPT | Performed by: OBSTETRICS & GYNECOLOGY

## 2022-04-07 LAB — AP REPORT: NORMAL

## 2022-04-08 ENCOUNTER — LAB REQUISITION (OUTPATIENT)
Dept: LAB | Age: 32
End: 2022-04-08

## 2022-04-08 DIAGNOSIS — Z12.4 ENCOUNTER FOR SCREENING FOR MALIGNANT NEOPLASM OF CERVIX: ICD-10-CM

## 2022-04-08 DIAGNOSIS — Z11.51 ENCOUNTER FOR SCREENING FOR HUMAN PAPILLOMAVIRUS (HPV): ICD-10-CM

## 2022-04-11 ENCOUNTER — APPOINTMENT (OUTPATIENT)
Dept: OBGYN | Age: 32
End: 2022-04-11

## 2022-04-11 LAB
HPV16+18+45 E6+E7MRNA CVX NAA+PROBE: NEGATIVE
HPV16+18+45 E6+E7MRNA CVX NAA+PROBE: NEGATIVE
Lab: NORMAL
Lab: NORMAL

## 2022-04-22 ENCOUNTER — APPOINTMENT (OUTPATIENT)
Dept: FAMILY MEDICINE | Age: 32
End: 2022-04-22

## 2022-07-12 ENCOUNTER — TELEPHONE (OUTPATIENT)
Dept: FAMILY MEDICINE | Age: 32
End: 2022-07-12

## 2022-12-08 ENCOUNTER — CASE MANAGEMENT (OUTPATIENT)
Dept: CARE COORDINATION | Age: 32
End: 2022-12-08

## 2022-12-12 ENCOUNTER — CASE MANAGEMENT (OUTPATIENT)
Dept: CARE COORDINATION | Age: 32
End: 2022-12-12

## 2022-12-17 ENCOUNTER — CASE MANAGEMENT (OUTPATIENT)
Dept: CARE COORDINATION | Age: 32
End: 2022-12-17

## 2022-12-19 ENCOUNTER — CASE MANAGEMENT (OUTPATIENT)
Dept: CARE COORDINATION | Age: 32
End: 2022-12-19

## 2022-12-28 ENCOUNTER — CASE MANAGEMENT (OUTPATIENT)
Dept: CARE COORDINATION | Age: 32
End: 2022-12-28

## 2023-06-21 ENCOUNTER — TELEPHONE (OUTPATIENT)
Dept: FAMILY MEDICINE | Age: 33
End: 2023-06-21

## 2023-06-23 ENCOUNTER — E-ADVICE (OUTPATIENT)
Dept: FAMILY MEDICINE | Age: 33
End: 2023-06-23

## 2023-11-08 ENCOUNTER — OFFICE VISIT (OUTPATIENT)
Dept: FAMILY MEDICINE | Age: 33
End: 2023-11-08

## 2023-11-08 ENCOUNTER — MED INFO FORMS (OUTPATIENT)
Dept: FAMILY MEDICINE | Age: 33
End: 2023-11-08

## 2023-11-08 ENCOUNTER — LAB SERVICES (OUTPATIENT)
Dept: LAB | Age: 33
End: 2023-11-08

## 2023-11-08 VITALS
DIASTOLIC BLOOD PRESSURE: 70 MMHG | TEMPERATURE: 98.1 F | HEART RATE: 96 BPM | OXYGEN SATURATION: 96 % | RESPIRATION RATE: 18 BRPM | HEIGHT: 60 IN | BODY MASS INDEX: 31.41 KG/M2 | WEIGHT: 160 LBS | SYSTOLIC BLOOD PRESSURE: 118 MMHG

## 2023-11-08 DIAGNOSIS — Z86.73 HX OF COMPLETED STROKE: ICD-10-CM

## 2023-11-08 DIAGNOSIS — Z00.00 MEDICARE ANNUAL WELLNESS VISIT, SUBSEQUENT: Primary | ICD-10-CM

## 2023-11-08 DIAGNOSIS — G89.29 CHRONIC LEFT-SIDED LOW BACK PAIN WITHOUT SCIATICA: ICD-10-CM

## 2023-11-08 DIAGNOSIS — M25.551 PAIN OF RIGHT HIP: ICD-10-CM

## 2023-11-08 DIAGNOSIS — M54.50 CHRONIC LEFT-SIDED LOW BACK PAIN WITHOUT SCIATICA: ICD-10-CM

## 2023-11-08 DIAGNOSIS — F41.9 ANXIETY: ICD-10-CM

## 2023-11-08 DIAGNOSIS — Z00.00 MEDICARE ANNUAL WELLNESS VISIT, SUBSEQUENT: ICD-10-CM

## 2023-11-08 DIAGNOSIS — F31.9 BIPOLAR AFFECTIVE DISORDER, REMISSION STATUS UNSPECIFIED (CMD): ICD-10-CM

## 2023-11-08 DIAGNOSIS — Z00.01 ENCOUNTER FOR GENERAL ADULT MEDICAL EXAMINATION WITH ABNORMAL FINDINGS: ICD-10-CM

## 2023-11-08 LAB
ALBUMIN SERPL-MCNC: 3.8 G/DL (ref 3.6–5.1)
ALBUMIN/GLOB SERPL: 1.3 {RATIO} (ref 1–2.4)
ALP SERPL-CCNC: 86 UNITS/L (ref 45–117)
ALT SERPL-CCNC: 25 UNITS/L
ANION GAP SERPL CALC-SCNC: 12 MMOL/L (ref 7–19)
AST SERPL-CCNC: 16 UNITS/L
BASOPHILS # BLD: 0.1 K/MCL (ref 0–0.3)
BASOPHILS NFR BLD: 1 %
BILIRUB SERPL-MCNC: 0.3 MG/DL (ref 0.2–1)
BUN SERPL-MCNC: 6 MG/DL (ref 6–20)
BUN/CREAT SERPL: 7 (ref 7–25)
CALCIUM SERPL-MCNC: 8.8 MG/DL (ref 8.4–10.2)
CHLORIDE SERPL-SCNC: 105 MMOL/L (ref 97–110)
CHOLEST SERPL-MCNC: 159 MG/DL
CHOLEST/HDLC SERPL: 3.3 {RATIO}
CO2 SERPL-SCNC: 28 MMOL/L (ref 21–32)
CREAT SERPL-MCNC: 0.88 MG/DL (ref 0.51–0.95)
DEPRECATED RDW RBC: 46.7 FL (ref 39–50)
EGFRCR SERPLBLD CKD-EPI 2021: 89 ML/MIN/{1.73_M2}
EOSINOPHIL # BLD: 0.3 K/MCL (ref 0–0.5)
EOSINOPHIL NFR BLD: 5 %
ERYTHROCYTE [DISTWIDTH] IN BLOOD: 13.5 % (ref 11–15)
FASTING DURATION TIME PATIENT: NORMAL H
GLOBULIN SER-MCNC: 2.9 G/DL (ref 2–4)
GLUCOSE SERPL-MCNC: 78 MG/DL (ref 70–99)
HBA1C MFR BLD: 5.2 % (ref 4.5–5.6)
HCT VFR BLD CALC: 43.1 % (ref 36–46.5)
HDLC SERPL-MCNC: 48 MG/DL
HGB BLD-MCNC: 14.1 G/DL (ref 12–15.5)
IMM GRANULOCYTES # BLD AUTO: 0 K/MCL (ref 0–0.2)
IMM GRANULOCYTES # BLD: 0 %
LDLC SERPL CALC-MCNC: 97 MG/DL
LYMPHOCYTES # BLD: 1.6 K/MCL (ref 1–4.8)
LYMPHOCYTES NFR BLD: 27 %
MCH RBC QN AUTO: 30.9 PG (ref 26–34)
MCHC RBC AUTO-ENTMCNC: 32.7 G/DL (ref 32–36.5)
MCV RBC AUTO: 94.3 FL (ref 78–100)
MONOCYTES # BLD: 0.5 K/MCL (ref 0.3–0.9)
MONOCYTES NFR BLD: 9 %
NEUTROPHILS # BLD: 3.4 K/MCL (ref 1.8–7.7)
NEUTROPHILS NFR BLD: 58 %
NONHDLC SERPL-MCNC: 111 MG/DL
NRBC BLD MANUAL-RTO: 0 /100 WBC
PLATELET # BLD AUTO: 178 K/MCL (ref 140–450)
POTASSIUM SERPL-SCNC: 4.3 MMOL/L (ref 3.4–5.1)
PROT SERPL-MCNC: 6.7 G/DL (ref 6.4–8.2)
RBC # BLD: 4.57 MIL/MCL (ref 4–5.2)
SODIUM SERPL-SCNC: 141 MMOL/L (ref 135–145)
TRIGL SERPL-MCNC: 71 MG/DL
TSH SERPL-ACNC: 0.85 MCUNITS/ML (ref 0.35–5)
WBC # BLD: 6 K/MCL (ref 4.2–11)

## 2023-11-08 PROCEDURE — 80053 COMPREHEN METABOLIC PANEL: CPT | Performed by: INTERNAL MEDICINE

## 2023-11-08 PROCEDURE — 83036 HEMOGLOBIN GLYCOSYLATED A1C: CPT | Performed by: INTERNAL MEDICINE

## 2023-11-08 PROCEDURE — 80061 LIPID PANEL: CPT | Performed by: INTERNAL MEDICINE

## 2023-11-08 PROCEDURE — 85025 COMPLETE CBC W/AUTO DIFF WBC: CPT | Performed by: INTERNAL MEDICINE

## 2023-11-08 PROCEDURE — G0438 PPPS, INITIAL VISIT: HCPCS | Performed by: PHYSICIAN ASSISTANT

## 2023-11-08 PROCEDURE — 84443 ASSAY THYROID STIM HORMONE: CPT | Performed by: INTERNAL MEDICINE

## 2023-11-08 PROCEDURE — 99213 OFFICE O/P EST LOW 20 MIN: CPT | Performed by: PHYSICIAN ASSISTANT

## 2023-11-08 PROCEDURE — 36415 COLL VENOUS BLD VENIPUNCTURE: CPT | Performed by: PHYSICIAN ASSISTANT

## 2023-11-08 RX ORDER — FLUOXETINE 10 MG/1
10 CAPSULE ORAL DAILY
Qty: 30 CAPSULE | Refills: 1 | Status: SHIPPED | OUTPATIENT
Start: 2023-11-08

## 2023-11-08 RX ORDER — MELOXICAM 15 MG/1
15 TABLET ORAL
Qty: 30 TABLET | Refills: 1 | Status: SHIPPED | OUTPATIENT
Start: 2023-11-08

## 2023-11-08 SDOH — HEALTH STABILITY: MENTAL HEALTH: AUDIT TOTAL SCORE: 0

## 2023-11-08 SDOH — HEALTH STABILITY: MENTAL HEALTH: HOW MANY STANDARD DRINKS CONTAINING ALCOHOL DO YOU HAVE ON A TYPICAL DAY?: 0,1 OR 2

## 2023-11-08 SDOH — HEALTH STABILITY: MENTAL HEALTH: HOW OFTEN DO YOU HAVE 6 OR MORE DRINKS ON ONE OCCASION?: NEVER

## 2023-11-08 SDOH — HEALTH STABILITY: MENTAL HEALTH: HOW OFTEN DO YOU HAVE A DRINK CONTAINING ALCOHOL?: NEVER

## 2023-11-08 SDOH — HEALTH STABILITY: PHYSICAL HEALTH: ON AVERAGE, HOW MANY DAYS PER WEEK DO YOU ENGAGE IN MODERATE TO STRENUOUS EXERCISE (LIKE A BRISK WALK)?: 0 DAYS

## 2023-11-08 SDOH — HEALTH STABILITY: PHYSICAL HEALTH: ON AVERAGE, HOW MANY MINUTES DO YOU ENGAGE IN EXERCISE AT THIS LEVEL?: 0 MIN

## 2023-11-08 ASSESSMENT — PATIENT HEALTH QUESTIONNAIRE - PHQ9
9. THOUGHTS THAT YOU WOULD BE BETTER OFF DEAD, OR OF HURTING YOURSELF: NOT AT ALL
SUM OF ALL RESPONSES TO PHQ9 QUESTIONS 1 AND 2: 6
2. FEELING DOWN, DEPRESSED OR HOPELESS: NEARLY EVERY DAY
SUM OF ALL RESPONSES TO PHQ9 QUESTIONS 1 AND 2: 6
CLINICAL INTERPRETATION OF PHQ2 SCORE: FURTHER SCREENING NEEDED
10. IF YOU CHECKED OFF ANY PROBLEMS, HOW DIFFICULT HAVE THESE PROBLEMS MADE IT FOR YOU TO DO YOUR WORK, TAKE CARE OF THINGS AT HOME, OR GET ALONG WITH OTHER PEOPLE: NOT DIFFICULT AT ALL
CLINICAL INTERPRETATION OF PHQ9 SCORE: SEVERE DEPRESSION
1. LITTLE INTEREST OR PLEASURE IN DOING THINGS: NEARLY EVERY DAY
4. FEELING TIRED OR HAVING LITTLE ENERGY: NEARLY EVERY DAY
7. TROUBLE CONCENTRATING ON THINGS, SUCH AS READING THE NEWSPAPER OR WATCHING TELEVISION: SEVERAL DAYS
5. POOR APPETITE, WEIGHT LOSS, OR OVEREATING: NEARLY EVERY DAY
8. MOVING OR SPEAKING SO SLOWLY THAT OTHER PEOPLE COULD HAVE NOTICED. OR THE OPPOSITE, BEING SO FIGETY OR RESTLESS THAT YOU HAVE BEEN MOVING AROUND A LOT MORE THAN USUAL: NEARLY EVERY DAY
6. FEELING BAD ABOUT YOURSELF - OR THAT YOU ARE A FAILURE OR HAVE LET YOURSELF OR YOUR FAMILY DOWN: SEVERAL DAYS
SUM OF ALL RESPONSES TO PHQ QUESTIONS 1-9: 20
3. TROUBLE FALLING OR STAYING ASLEEP OR SLEEPING TOO MUCH: NEARLY EVERY DAY

## 2023-11-12 PROBLEM — F41.9 ANXIETY: Status: ACTIVE | Noted: 2023-11-12

## 2023-11-12 PROBLEM — Z86.73 HX OF COMPLETED STROKE: Status: ACTIVE | Noted: 2023-11-12

## 2023-11-29 ENCOUNTER — IMAGING SERVICES (OUTPATIENT)
Dept: GENERAL RADIOLOGY | Age: 33
End: 2023-11-29
Attending: ORTHOPAEDIC SURGERY

## 2023-11-29 ENCOUNTER — APPOINTMENT (OUTPATIENT)
Dept: ORTHOPEDICS | Age: 33
End: 2023-11-29
Attending: PHYSICIAN ASSISTANT

## 2023-11-29 VITALS — HEIGHT: 60 IN | WEIGHT: 160 LBS | BODY MASS INDEX: 31.41 KG/M2

## 2023-11-29 DIAGNOSIS — M25.859 FEMORAL ACETABULAR IMPINGEMENT: ICD-10-CM

## 2023-11-29 DIAGNOSIS — M25.551 RIGHT HIP PAIN: Primary | ICD-10-CM

## 2023-11-29 DIAGNOSIS — M25.551 RIGHT HIP PAIN: ICD-10-CM

## 2023-11-29 PROCEDURE — 73502 X-RAY EXAM HIP UNI 2-3 VIEWS: CPT | Performed by: RADIOLOGY

## 2023-11-29 PROCEDURE — 99204 OFFICE O/P NEW MOD 45 MIN: CPT | Performed by: ORTHOPAEDIC SURGERY

## 2024-01-11 ENCOUNTER — TELEPHONE (OUTPATIENT)
Dept: FAMILY MEDICINE | Age: 34
End: 2024-01-11

## 2024-01-31 ENCOUNTER — TELEPHONE (OUTPATIENT)
Dept: ORTHOPEDICS | Age: 34
End: 2024-01-31

## 2024-02-05 ENCOUNTER — E-ADVICE (OUTPATIENT)
Dept: ORTHOPEDICS | Age: 34
End: 2024-02-05

## 2024-03-08 ENCOUNTER — EXTERNAL RECORD (OUTPATIENT)
Dept: HEALTH INFORMATION MANAGEMENT | Facility: OTHER | Age: 34
End: 2024-03-08

## 2024-04-24 ENCOUNTER — APPOINTMENT (OUTPATIENT)
Dept: ORTHOPEDICS | Age: 34
End: 2024-04-24

## 2024-11-30 RX ORDER — FLUOXETINE 10 MG/1
10 CAPSULE ORAL DAILY
Qty: 30 CAPSULE | Refills: 1 | Status: SHIPPED | OUTPATIENT
Start: 2024-11-30

## 2024-12-12 ENCOUNTER — OFFICE VISIT (OUTPATIENT)
Dept: FAMILY MEDICINE | Age: 34
End: 2024-12-12

## 2024-12-12 ENCOUNTER — LAB SERVICES (OUTPATIENT)
Dept: LAB | Age: 34
End: 2024-12-12

## 2024-12-12 ENCOUNTER — APPOINTMENT (OUTPATIENT)
Dept: FAMILY MEDICINE | Age: 34
End: 2024-12-12

## 2024-12-12 VITALS
DIASTOLIC BLOOD PRESSURE: 70 MMHG | SYSTOLIC BLOOD PRESSURE: 108 MMHG | WEIGHT: 151.5 LBS | BODY MASS INDEX: 29.74 KG/M2 | RESPIRATION RATE: 18 BRPM | HEIGHT: 60 IN | HEART RATE: 87 BPM

## 2024-12-12 DIAGNOSIS — F41.9 ANXIETY: ICD-10-CM

## 2024-12-12 DIAGNOSIS — F31.9 BIPOLAR AFFECTIVE DISORDER, REMISSION STATUS UNSPECIFIED  (CMD): ICD-10-CM

## 2024-12-12 DIAGNOSIS — Z00.00 MEDICARE ANNUAL WELLNESS VISIT, SUBSEQUENT: ICD-10-CM

## 2024-12-12 DIAGNOSIS — Z00.00 MEDICARE ANNUAL WELLNESS VISIT, SUBSEQUENT: Primary | ICD-10-CM

## 2024-12-12 DIAGNOSIS — Z86.73 HX OF COMPLETED STROKE: ICD-10-CM

## 2024-12-12 DIAGNOSIS — Z30.019 ENCOUNTER FOR FEMALE BIRTH CONTROL: ICD-10-CM

## 2024-12-12 LAB
BASOPHILS # BLD: 0.1 K/MCL (ref 0–0.3)
BASOPHILS NFR BLD: 1 %
DEPRECATED RDW RBC: 44.1 FL (ref 39–50)
EOSINOPHIL # BLD: 0.4 K/MCL (ref 0–0.5)
EOSINOPHIL NFR BLD: 6 %
ERYTHROCYTE [DISTWIDTH] IN BLOOD: 12.9 % (ref 11–15)
HBA1C MFR BLD: 4.8 % (ref 4.5–5.6)
HCT VFR BLD CALC: 40.5 % (ref 36–46.5)
HGB BLD-MCNC: 13.2 G/DL (ref 12–15.5)
IMM GRANULOCYTES # BLD AUTO: 0 K/MCL (ref 0–0.2)
IMM GRANULOCYTES # BLD: 0 %
LYMPHOCYTES # BLD: 1.5 K/MCL (ref 1–4.8)
LYMPHOCYTES NFR BLD: 23 %
MCH RBC QN AUTO: 30.4 PG (ref 26–34)
MCHC RBC AUTO-ENTMCNC: 32.6 G/DL (ref 32–36.5)
MCV RBC AUTO: 93.3 FL (ref 78–100)
MONOCYTES # BLD: 0.6 K/MCL (ref 0.3–0.9)
MONOCYTES NFR BLD: 9 %
NEUTROPHILS # BLD: 4.1 K/MCL (ref 1.8–7.7)
NEUTROPHILS NFR BLD: 61 %
NRBC BLD MANUAL-RTO: 0 /100 WBC
PLATELET # BLD AUTO: 183 K/MCL (ref 140–450)
RBC # BLD: 4.34 MIL/MCL (ref 4–5.2)
WBC # BLD: 6.7 K/MCL (ref 4.2–11)

## 2024-12-12 PROCEDURE — 85025 COMPLETE CBC W/AUTO DIFF WBC: CPT | Performed by: INTERNAL MEDICINE

## 2024-12-12 PROCEDURE — 80061 LIPID PANEL: CPT | Performed by: INTERNAL MEDICINE

## 2024-12-12 PROCEDURE — 80053 COMPREHEN METABOLIC PANEL: CPT | Performed by: INTERNAL MEDICINE

## 2024-12-12 PROCEDURE — 99214 OFFICE O/P EST MOD 30 MIN: CPT | Performed by: PHYSICIAN ASSISTANT

## 2024-12-12 PROCEDURE — 84443 ASSAY THYROID STIM HORMONE: CPT | Performed by: INTERNAL MEDICINE

## 2024-12-12 PROCEDURE — 36415 COLL VENOUS BLD VENIPUNCTURE: CPT | Performed by: PHYSICIAN ASSISTANT

## 2024-12-12 PROCEDURE — G0439 PPPS, SUBSEQ VISIT: HCPCS | Performed by: PHYSICIAN ASSISTANT

## 2024-12-12 PROCEDURE — 83036 HEMOGLOBIN GLYCOSYLATED A1C: CPT | Performed by: INTERNAL MEDICINE

## 2024-12-12 RX ORDER — SERTRALINE HYDROCHLORIDE 25 MG/1
25 TABLET, FILM COATED ORAL DAILY
Qty: 30 TABLET | Refills: 1 | Status: SHIPPED | OUTPATIENT
Start: 2024-12-12

## 2024-12-12 SDOH — HEALTH STABILITY: MENTAL HEALTH: THOUGHTS THAT YOU WOULD BE BETTER OFF DEAD, OR OF HURTING YOURSELF?: NOT AT ALL

## 2024-12-12 SDOH — HEALTH STABILITY: MENTAL HEALTH: PHQ9 TOTAL SCORE: 21

## 2024-12-12 SDOH — HEALTH STABILITY: MENTAL HEALTH: DEPRESSION SCREENING SCORE: 6

## 2024-12-12 SDOH — HEALTH STABILITY: MENTAL HEALTH: LITTLE INTEREST OR PLEASURE IN ACTIVITY?: NEARLY EVERY DAY

## 2024-12-12 SDOH — HEALTH STABILITY: MENTAL HEALTH
MOVING OR SPEAKING SLOWLY THAT OTHER PEOPLE HAVE NOTICED OR THE OPPOSITE - BEING SO FIDGETY OR RESTLESS THAT YOU HAVE BEEN MOVING AROUND A LOT MORE THAN USUAL?: NEARLY EVERY DAY

## 2024-12-12 SDOH — HEALTH STABILITY: MENTAL HEALTH: TROUBLE FALLING OR STAYING ASLEEP OR SLEEPING ALL THE TIME?: NEARLY EVERY DAY

## 2024-12-12 SDOH — SOCIAL STABILITY: SOCIAL INSECURITY: FEELING BAD ABOUT YOURSELF OR THAT YOU ARE A FAILURE OR HAVE LET YOURSELF OR FAMILY DOWN?: NEARLY EVERY DAY

## 2024-12-12 SDOH — HEALTH STABILITY: MENTAL HEALTH: TROUBLE CONCENTRATING ON THINGS SUCH AS READING THE NEWSPAPER OR WATCHING TV?: NOT AT ALL

## 2024-12-12 SDOH — HEALTH STABILITY: MENTAL HEALTH: PHQ2 INTERPRETATION: FURTHER SCREENING NEEDED

## 2024-12-12 SDOH — HEALTH STABILITY: MENTAL HEALTH: POOR APPETITE OR OVEREATING?: NEARLY EVERY DAY

## 2024-12-12 SDOH — HEALTH STABILITY: MENTAL HEALTH: PHQ9 INTERPRETATION: SEVERE DEPRESSION

## 2024-12-12 SDOH — SOCIAL STABILITY: SOCIAL INSECURITY
IF YOU REPORTED ANY PROBLEMS, HOW DIFFICULT HAVE THESE PROBLEMS MADE IT TO DO YOUR WORK, TAKE CARE OF THINGS AT HOME, OR GET ALONG WITH OTHER PEOPLE?: NOT DIFFICULT AT ALL

## 2024-12-12 SDOH — HEALTH STABILITY: MENTAL HEALTH: FEELING DOWN, DEPRESSED OR HOPELESS?: NEARLY EVERY DAY

## 2024-12-12 SDOH — HEALTH STABILITY: MENTAL HEALTH: FEELING TIRED OR HAVING LITTLE ENERGY?: NEARLY EVERY DAY

## 2024-12-12 ASSESSMENT — PATIENT HEALTH QUESTIONNAIRE - PHQ9: SUM OF ALL RESPONSES TO PHQ9 QUESTIONS 1 AND 2: 6

## 2024-12-13 LAB
ALBUMIN SERPL-MCNC: 3.8 G/DL (ref 3.4–5)
ALBUMIN/GLOB SERPL: 1.4 {RATIO} (ref 1–2.4)
ALP SERPL-CCNC: 71 UNITS/L (ref 45–117)
ALT SERPL-CCNC: 15 UNITS/L
ANION GAP SERPL CALC-SCNC: 12 MMOL/L (ref 7–19)
AST SERPL-CCNC: 15 UNITS/L
BILIRUB SERPL-MCNC: 0.3 MG/DL (ref 0.2–1)
BUN SERPL-MCNC: 7 MG/DL (ref 6–20)
BUN/CREAT SERPL: 7 (ref 7–25)
CALCIUM SERPL-MCNC: 8.7 MG/DL (ref 8.4–10.2)
CHLORIDE SERPL-SCNC: 105 MMOL/L (ref 97–110)
CHOLEST SERPL-MCNC: 175 MG/DL
CHOLEST/HDLC SERPL: 3 {RATIO}
CO2 SERPL-SCNC: 28 MMOL/L (ref 21–32)
CREAT SERPL-MCNC: 0.94 MG/DL (ref 0.51–0.95)
EGFRCR SERPLBLD CKD-EPI 2021: 82 ML/MIN/{1.73_M2}
FASTING DURATION TIME PATIENT: 8 HOURS (ref 0–999)
GLOBULIN SER-MCNC: 2.8 G/DL (ref 2–4)
GLUCOSE SERPL-MCNC: 88 MG/DL (ref 70–99)
HDLC SERPL-MCNC: 58 MG/DL
LDLC SERPL CALC-MCNC: 110 MG/DL
NONHDLC SERPL-MCNC: 117 MG/DL
POTASSIUM SERPL-SCNC: 4.1 MMOL/L (ref 3.4–5.1)
PROT SERPL-MCNC: 6.6 G/DL (ref 6.4–8.2)
SODIUM SERPL-SCNC: 141 MMOL/L (ref 135–145)
TRIGL SERPL-MCNC: 35 MG/DL
TSH SERPL-ACNC: 0.97 MCUNITS/ML (ref 0.35–5)

## 2024-12-18 ENCOUNTER — TELEPHONE (OUTPATIENT)
Dept: FAMILY MEDICINE | Age: 34
End: 2024-12-18

## 2025-01-14 ENCOUNTER — APPOINTMENT (OUTPATIENT)
Dept: OBGYN | Age: 35
End: 2025-01-14
Attending: PHYSICIAN ASSISTANT

## 2025-01-20 ENCOUNTER — TELEPHONE (OUTPATIENT)
Dept: FAMILY MEDICINE | Age: 35
End: 2025-01-20

## 2025-01-27 ENCOUNTER — TELEPHONE (OUTPATIENT)
Dept: OBGYN | Age: 35
End: 2025-01-27

## 2025-01-29 ENCOUNTER — OFFICE VISIT (OUTPATIENT)
Dept: OBGYN | Age: 35
End: 2025-01-29

## 2025-01-29 VITALS
HEIGHT: 60 IN | TEMPERATURE: 98.6 F | WEIGHT: 150.31 LBS | RESPIRATION RATE: 14 BRPM | BODY MASS INDEX: 29.51 KG/M2 | HEART RATE: 72 BPM | SYSTOLIC BLOOD PRESSURE: 114 MMHG | OXYGEN SATURATION: 97 % | DIASTOLIC BLOOD PRESSURE: 73 MMHG

## 2025-01-29 DIAGNOSIS — Z11.51 SCREENING FOR HPV (HUMAN PAPILLOMAVIRUS): ICD-10-CM

## 2025-01-29 DIAGNOSIS — R10.2 PELVIC PAIN SYNDROME: Primary | ICD-10-CM

## 2025-01-29 DIAGNOSIS — N92.6 IRREGULAR MENSES: ICD-10-CM

## 2025-01-29 DIAGNOSIS — Z12.4 SCREENING FOR CERVICAL CANCER: ICD-10-CM

## 2025-01-29 PROCEDURE — 99214 OFFICE O/P EST MOD 30 MIN: CPT | Performed by: OBSTETRICS & GYNECOLOGY

## 2025-01-29 RX ORDER — CYCLOBENZAPRINE HCL 5 MG
5 TABLET ORAL 2 TIMES DAILY PRN
Qty: 30 TABLET | Refills: 1 | Status: SHIPPED | OUTPATIENT
Start: 2025-01-29 | End: 2025-02-28

## 2025-01-29 ASSESSMENT — ENCOUNTER SYMPTOMS
PSYCHIATRIC NEGATIVE: 1
CONSTITUTIONAL NEGATIVE: 1
GASTROINTESTINAL NEGATIVE: 1

## 2025-01-30 LAB
BACTERIAL VAGINOSIS VAG-IMP: NOT DETECTED
C ALBICANS DNA VAG QL NAA+PROBE: NOT DETECTED
C GLABRATA DNA VAG QL NAA+PROBE: NOT DETECTED
SERVICE CMNT-IMP: NORMAL
SERVICE CMNT-IMP: NORMAL
T VAGINALIS DNA VAG QL NAA+PROBE: NOT DETECTED

## 2025-02-05 ENCOUNTER — APPOINTMENT (OUTPATIENT)
Dept: ULTRASOUND IMAGING | Age: 35
End: 2025-02-05
Attending: OBSTETRICS & GYNECOLOGY

## 2025-02-05 DIAGNOSIS — R10.2 PELVIC PAIN SYNDROME: ICD-10-CM

## 2025-02-05 PROCEDURE — 76856 US EXAM PELVIC COMPLETE: CPT | Performed by: RADIOLOGY

## 2025-02-05 PROCEDURE — 76830 TRANSVAGINAL US NON-OB: CPT | Performed by: RADIOLOGY

## 2025-02-06 LAB
CASE RPRT: NORMAL
CYTOLOGY CVX/VAG STUDY: NORMAL
HPV16+18+45 E6+E7MRNA CVX NAA+PROBE: NEGATIVE
Lab: NORMAL
PAP EDUCATIONAL NOTE: NORMAL
SPECIMEN ADEQUACY: NORMAL

## 2025-03-13 ENCOUNTER — TELEPHONE (OUTPATIENT)
Dept: ULTRASOUND IMAGING | Age: 35
End: 2025-03-13

## 2025-03-14 ENCOUNTER — OFFICE VISIT (OUTPATIENT)
Dept: OBGYN | Age: 35
End: 2025-03-14

## 2025-03-14 VITALS
TEMPERATURE: 96.8 F | DIASTOLIC BLOOD PRESSURE: 62 MMHG | WEIGHT: 144 LBS | HEART RATE: 93 BPM | BODY MASS INDEX: 28.12 KG/M2 | SYSTOLIC BLOOD PRESSURE: 102 MMHG

## 2025-03-14 DIAGNOSIS — Z30.46 NEXPLANON REMOVAL: Primary | ICD-10-CM

## 2025-03-14 DIAGNOSIS — Z01.812 PRE-PROCEDURE LAB EXAM: ICD-10-CM

## 2025-03-14 LAB
B-HCG UR QL: NEGATIVE
INTERNAL PROCEDURAL CONTROLS ACCEPTABLE: YES
TEST LOT EXPIRATION DATE: NORMAL
TEST LOT NUMBER: NORMAL

## 2025-03-14 PROCEDURE — 11982 REMOVE DRUG IMPLANT DEVICE: CPT | Performed by: OBSTETRICS & GYNECOLOGY

## 2025-03-14 PROCEDURE — 81025 URINE PREGNANCY TEST: CPT | Performed by: OBSTETRICS & GYNECOLOGY

## 2025-03-19 ENCOUNTER — APPOINTMENT (OUTPATIENT)
Dept: OBGYN | Age: 35
End: 2025-03-19

## 2025-03-26 ENCOUNTER — APPOINTMENT (OUTPATIENT)
Dept: OBGYN | Age: 35
End: 2025-03-26

## 2025-04-17 RX ORDER — SERTRALINE HYDROCHLORIDE 25 MG/1
25 TABLET, FILM COATED ORAL DAILY
Qty: 30 TABLET | Refills: 1 | OUTPATIENT
Start: 2025-04-17

## 2025-06-30 ENCOUNTER — APPOINTMENT (OUTPATIENT)
Dept: OBGYN | Age: 35
End: 2025-06-30

## 2025-12-08 ENCOUNTER — APPOINTMENT (OUTPATIENT)
Dept: OBGYN | Age: 35
End: 2025-12-08